# Patient Record
Sex: FEMALE | Race: BLACK OR AFRICAN AMERICAN | Employment: UNEMPLOYED | URBAN - METROPOLITAN AREA
[De-identification: names, ages, dates, MRNs, and addresses within clinical notes are randomized per-mention and may not be internally consistent; named-entity substitution may affect disease eponyms.]

---

## 2024-02-29 PROCEDURE — 93005 ELECTROCARDIOGRAM TRACING: CPT | Performed by: EMERGENCY MEDICINE

## 2024-02-29 PROCEDURE — 96374 THER/PROPH/DIAG INJ IV PUSH: CPT | Performed by: EMERGENCY MEDICINE

## 2024-02-29 PROCEDURE — 99285 EMERGENCY DEPT VISIT HI MDM: CPT | Mod: 25 | Performed by: EMERGENCY MEDICINE

## 2024-02-29 PROCEDURE — 99291 CRITICAL CARE FIRST HOUR: CPT | Mod: 25 | Performed by: EMERGENCY MEDICINE

## 2024-02-29 PROCEDURE — 93010 ELECTROCARDIOGRAM REPORT: CPT | Performed by: EMERGENCY MEDICINE

## 2024-02-29 ASSESSMENT — COLUMBIA-SUICIDE SEVERITY RATING SCALE - C-SSRS
1. IN THE PAST MONTH, HAVE YOU WISHED YOU WERE DEAD OR WISHED YOU COULD GO TO SLEEP AND NOT WAKE UP?: NO
6. HAVE YOU EVER DONE ANYTHING, STARTED TO DO ANYTHING, OR PREPARED TO DO ANYTHING TO END YOUR LIFE?: NO
2. HAVE YOU ACTUALLY HAD ANY THOUGHTS OF KILLING YOURSELF IN THE PAST MONTH?: NO

## 2024-03-01 ENCOUNTER — HOSPITAL ENCOUNTER (EMERGENCY)
Facility: CLINIC | Age: 74
Discharge: HOME | End: 2024-03-01
Attending: EMERGENCY MEDICINE | Admitting: EMERGENCY MEDICINE

## 2024-03-01 ENCOUNTER — APPOINTMENT (OUTPATIENT)
Dept: CT IMAGING | Facility: CLINIC | Age: 74
End: 2024-03-01
Attending: EMERGENCY MEDICINE

## 2024-03-01 ENCOUNTER — APPOINTMENT (OUTPATIENT)
Dept: GENERAL RADIOLOGY | Facility: CLINIC | Age: 74
End: 2024-03-01
Attending: EMERGENCY MEDICINE

## 2024-03-01 VITALS
HEART RATE: 68 BPM | TEMPERATURE: 97.3 F | RESPIRATION RATE: 15 BRPM | DIASTOLIC BLOOD PRESSURE: 76 MMHG | OXYGEN SATURATION: 95 % | WEIGHT: 143.7 LBS | SYSTOLIC BLOOD PRESSURE: 152 MMHG | BODY MASS INDEX: 26.44 KG/M2 | HEIGHT: 62 IN

## 2024-03-01 DIAGNOSIS — R05.3 CHRONIC COUGH: ICD-10-CM

## 2024-03-01 DIAGNOSIS — I16.0 HYPERTENSIVE URGENCY: ICD-10-CM

## 2024-03-01 LAB
ALBUMIN SERPL BCG-MCNC: 4 G/DL (ref 3.5–5.2)
ALP SERPL-CCNC: 141 U/L (ref 40–150)
ALT SERPL W P-5'-P-CCNC: 8 U/L (ref 0–50)
ANION GAP SERPL CALCULATED.3IONS-SCNC: 13 MMOL/L (ref 7–15)
AST SERPL W P-5'-P-CCNC: 18 U/L (ref 0–45)
ATRIAL RATE - MUSE: 77 BPM
BASOPHILS # BLD AUTO: 0.1 10E3/UL (ref 0–0.2)
BASOPHILS NFR BLD AUTO: 1 %
BILIRUB SERPL-MCNC: 0.3 MG/DL
BUN SERPL-MCNC: 18.2 MG/DL (ref 8–23)
CALCIUM SERPL-MCNC: 9.1 MG/DL (ref 8.8–10.2)
CHLORIDE SERPL-SCNC: 102 MMOL/L (ref 98–107)
CREAT SERPL-MCNC: 0.74 MG/DL (ref 0.51–0.95)
DEPRECATED HCO3 PLAS-SCNC: 23 MMOL/L (ref 22–29)
DIASTOLIC BLOOD PRESSURE - MUSE: NORMAL MMHG
EGFRCR SERPLBLD CKD-EPI 2021: 85 ML/MIN/1.73M2
EOSINOPHIL # BLD AUTO: 0.2 10E3/UL (ref 0–0.7)
EOSINOPHIL NFR BLD AUTO: 3 %
ERYTHROCYTE [DISTWIDTH] IN BLOOD BY AUTOMATED COUNT: 13.5 % (ref 10–15)
FLUAV RNA SPEC QL NAA+PROBE: NEGATIVE
FLUBV RNA RESP QL NAA+PROBE: NEGATIVE
GLUCOSE SERPL-MCNC: 102 MG/DL (ref 70–99)
HCT VFR BLD AUTO: 35.4 % (ref 35–47)
HGB BLD-MCNC: 11.9 G/DL (ref 11.7–15.7)
IMM GRANULOCYTES # BLD: 0.1 10E3/UL
IMM GRANULOCYTES NFR BLD: 1 %
INTERPRETATION ECG - MUSE: NORMAL
LYMPHOCYTES # BLD AUTO: 2.1 10E3/UL (ref 0.8–5.3)
LYMPHOCYTES NFR BLD AUTO: 30 %
MCH RBC QN AUTO: 29.1 PG (ref 26.5–33)
MCHC RBC AUTO-ENTMCNC: 33.6 G/DL (ref 31.5–36.5)
MCV RBC AUTO: 87 FL (ref 78–100)
MONOCYTES # BLD AUTO: 0.7 10E3/UL (ref 0–1.3)
MONOCYTES NFR BLD AUTO: 10 %
NEUTROPHILS # BLD AUTO: 3.8 10E3/UL (ref 1.6–8.3)
NEUTROPHILS NFR BLD AUTO: 55 %
NRBC # BLD AUTO: 0 10E3/UL
NRBC BLD AUTO-RTO: 0 /100
P AXIS - MUSE: 48 DEGREES
PLATELET # BLD AUTO: 276 10E3/UL (ref 150–450)
POTASSIUM SERPL-SCNC: 3.9 MMOL/L (ref 3.4–5.3)
PR INTERVAL - MUSE: 180 MS
PROCALCITONIN SERPL IA-MCNC: 0.02 NG/ML
PROT SERPL-MCNC: 6.8 G/DL (ref 6.4–8.3)
QRS DURATION - MUSE: 74 MS
QT - MUSE: 408 MS
QTC - MUSE: 461 MS
R AXIS - MUSE: -17 DEGREES
RBC # BLD AUTO: 4.09 10E6/UL (ref 3.8–5.2)
RSV RNA SPEC NAA+PROBE: NEGATIVE
SARS-COV-2 RNA RESP QL NAA+PROBE: NEGATIVE
SODIUM SERPL-SCNC: 138 MMOL/L (ref 135–145)
SYSTOLIC BLOOD PRESSURE - MUSE: NORMAL MMHG
T AXIS - MUSE: -14 DEGREES
TROPONIN T SERPL HS-MCNC: 7 NG/L
VENTRICULAR RATE- MUSE: 77 BPM
WBC # BLD AUTO: 6.9 10E3/UL (ref 4–11)

## 2024-03-01 PROCEDURE — 80053 COMPREHEN METABOLIC PANEL: CPT | Performed by: EMERGENCY MEDICINE

## 2024-03-01 PROCEDURE — 71046 X-RAY EXAM CHEST 2 VIEWS: CPT

## 2024-03-01 PROCEDURE — 36415 COLL VENOUS BLD VENIPUNCTURE: CPT | Performed by: EMERGENCY MEDICINE

## 2024-03-01 PROCEDURE — 87637 SARSCOV2&INF A&B&RSV AMP PRB: CPT | Performed by: EMERGENCY MEDICINE

## 2024-03-01 PROCEDURE — 250N000013 HC RX MED GY IP 250 OP 250 PS 637: Performed by: EMERGENCY MEDICINE

## 2024-03-01 PROCEDURE — 85025 COMPLETE CBC W/AUTO DIFF WBC: CPT | Performed by: EMERGENCY MEDICINE

## 2024-03-01 PROCEDURE — 84145 PROCALCITONIN (PCT): CPT | Performed by: EMERGENCY MEDICINE

## 2024-03-01 PROCEDURE — 84484 ASSAY OF TROPONIN QUANT: CPT | Performed by: EMERGENCY MEDICINE

## 2024-03-01 PROCEDURE — 250N000011 HC RX IP 250 OP 636: Mod: JZ | Performed by: EMERGENCY MEDICINE

## 2024-03-01 PROCEDURE — 70450 CT HEAD/BRAIN W/O DYE: CPT

## 2024-03-01 RX ORDER — ACETAMINOPHEN 325 MG/1
650 TABLET ORAL ONCE
Status: COMPLETED | OUTPATIENT
Start: 2024-03-01 | End: 2024-03-01

## 2024-03-01 RX ORDER — LABETALOL HYDROCHLORIDE 5 MG/ML
10 INJECTION, SOLUTION INTRAVENOUS ONCE
Status: COMPLETED | OUTPATIENT
Start: 2024-03-01 | End: 2024-03-01

## 2024-03-01 RX ORDER — AMLODIPINE BESYLATE 2.5 MG/1
2.5 TABLET ORAL DAILY
Qty: 30 TABLET | Refills: 0 | Status: SHIPPED | OUTPATIENT
Start: 2024-03-01

## 2024-03-01 RX ADMIN — ACETAMINOPHEN 650 MG: 325 TABLET, FILM COATED ORAL at 01:01

## 2024-03-01 RX ADMIN — LABETALOL HYDROCHLORIDE 10 MG: 5 INJECTION, SOLUTION INTRAVENOUS at 01:02

## 2024-03-01 ASSESSMENT — ACTIVITIES OF DAILY LIVING (ADL)
ADLS_ACUITY_SCORE: 35

## 2024-03-01 NOTE — DISCHARGE INSTRUCTIONS
Instructions from your doctor today:  Emergency Department (ED) testing is focused on the potential causes of your symptoms that are the most dangerous possibilities, and cannot cover every possibility. Based on the evaluation, it was deemed sufficiently safe to discharge and continue management through the clinics. Thus, follow-up is very important to assess for improvement/worsening, potential further testing, and potential treatment adjustments. If you were given opioid pain medications or other medications that can make you drowsy while in the ED, you should not drive for at least several hours and not until you feel completely back to normal.     Please make an appointment to follow up with:  - Your Primary Care Provider in 2-7 days  - If you do not have a primary care provider, you can be seen in follow-up and establish care by calling any of the clinics below:     - [facility] (phone: [phone])     - [facility] (phone: [phone])   - Have your clinic provider review the results from today's visit with you again, including any potential follow-up or additional testing that may be needed based on the results. Occasionally, incidental findings are found on later review by radiologists that may need follow-up.     Return to the Emergency Department immediately if you have worsening symptoms, or any other urgent health concerns.

## 2024-03-01 NOTE — ED PROVIDER NOTES
"  History     Chief Complaint   Patient presents with    Hypertension     Pt reports high BP at home, is not on any medication.  Pt recently [place] to the [place], has been here for 18 days.      Headache    Cough     Dry, infrequent cough for 18 days     HPI  Arabella Man is a 74 year old female with PMH notable for recent [place] the [place] ago  who presents to the ED with cough and headache.  Family used to interpret at patient request.  Patient notes cough for several weeks, did precede coming to the [place] 18 days ago.  Patient also with associated sore throat.  Mild discomfort in the right ear as well.  Patient notes pain in the left scapular area for the past 1 day as well.  No trauma.  Breathing feels normal.  Patient also notes pain on the right foot with onset around the same time.  No foot trauma.    Patient also notes issues with headache.  Symptoms started 1 day ago.  Patient reports similar symptoms in the past when having elevated blood pressure.  She did take a blood pressure medication when [place], has been off it for about 1 month.  Patient with right-sided headache.  No recent fall.  No numbness nor weakness.    Physical Exam   BP: (!) 208/92  Pulse: 109  Temp: 97.3  F (36.3  C)  Resp: 16  Height: 157.5 cm (5' 2\")  Weight: 65.2 kg (143 lb 11.2 oz)  SpO2: 98 %    Physical Exam  General: appears somewhat tired, occasionally holding right side of head. Appears stated age.   HENT: MMM, no oropharyngeal lesions.  Midline uvula, no significant tonsillar hypertrophy or exudate.  Bilateral ear canals and TMs normal.  Eyes: PERRL, normal sclerae   Cardio: regular rate. Regular rhythm. Extremities well perfused  Resp: Normal work of breathing, Normal respiratory rate. Clear to auscultation bilaterally.  Chest: Scapular area pain is reproduced with palpation over the inferior margin of the scapula, no visible signs of trauma  Abdomen: no tenderness, non-distended, no rebound, no guarding  Neuro: " alert without signs of confusion. Facial movements symmetric.  Extraocular movements all intact.  Vision grossly normal. Strength left: 5/5 , 5/5 elbow flexion, 5/5 elbow extension, 5/5 shoulder abduction, 5/5 hip flexion, 5/5 knee flexion, 5/5 knee extension. Strength right: 5/5 , 5/5 elbow flexion, 5/5 elbow extension, 5/5 shoulder abduction, 5/5 hip flexion, 5/5 knee flexion, 5/5 knee extension. Sensation intact to soft touch in all extremities. Normal tone, no clonus.   Psych: normal affect, normal behavior  Feet: No swelling, not overly warm, no skin break.  Right foot with mild diffuse tenderness does not localize well.    ED Course      Procedures            EKG Interpretation:      Interpreted by Sid Zendejas MD  Time reviewed: 0007  Symptoms at time of EKG: chest pain   Rhythm: normal sinus   Rate: Normal  Axis: Normal  Ectopy: none  Conduction: normal  ST Segments/ T Waves: No acute ischemic changes  Q Waves: none  Comparison to prior: No old EKG available    Clinical Impression: Voltage criteria consistent with LVH, otherwise normal sinus rhythm without evidence of acute ischemia               Labs Ordered and Resulted from Time of ED Arrival to Time of ED Departure   COMPREHENSIVE METABOLIC PANEL - Abnormal       Result Value    Sodium 138      Potassium 3.9      Carbon Dioxide (CO2) 23      Anion Gap 13      Urea Nitrogen 18.2      Creatinine 0.74      GFR Estimate 85      Calcium 9.1      Chloride 102      Glucose 102 (*)     Alkaline Phosphatase 141      AST 18      ALT 8      Protein Total 6.8      Albumin 4.0      Bilirubin Total 0.3     TROPONIN T, HIGH SENSITIVITY - Normal    Troponin T, High Sensitivity 7     PROCALCITONIN - Normal    Procalcitonin 0.02     INFLUENZA A/B, RSV, & SARS-COV2 PCR - Normal    Influenza A PCR Negative      Influenza B PCR Negative      RSV PCR Negative      SARS CoV2 PCR Negative     CBC WITH PLATELETS AND DIFFERENTIAL    WBC Count 6.9      RBC Count 4.09       Hemoglobin 11.9      Hematocrit 35.4      MCV 87      MCH 29.1      MCHC 33.6      RDW 13.5      Platelet Count 276      % Neutrophils 55      % Lymphocytes 30      % Monocytes 10      % Eosinophils 3      % Basophils 1      % Immature Granulocytes 1      NRBCs per 100 WBC 0      Absolute Neutrophils 3.8      Absolute Lymphocytes 2.1      Absolute Monocytes 0.7      Absolute Eosinophils 0.2      Absolute Basophils 0.1      Absolute Immature Granulocytes 0.1      Absolute NRBCs 0.0       CT Head w/o Contrast   Final Result   IMPRESSION:   1.  No CT finding of a mass, hemorrhage or focal area suggestive of acute infarct.   2.  Diffuse age related changes.      XR Chest 2 Views   Final Result   IMPRESSION: Somewhat low lung volumes. Mild bibasilar atelectasis. No pleural fluid or pneumothorax. Normal heart size and pulmonary vascularity.             Critical Care Addendum  My initial assessment, based on my review of vital signs, focused history, and physical exam, established a high suspicion that Arabella Man has severe hypertension, which requires immediate intervention, and therefore she is critically ill.     After the initial assessment, the care team initiated multiple lab tests, initiated medication therapy with IV labetalol, and performed CT head and CXR  to provide stabilization care. Due to the critical nature of this patient, I reassessed vital signs, physical exam, and neurologic status multiple times prior to her disposition.     Time also spent performing documentation, discussion with family to obtain medical information for decision making, reviewing test results, and coordination of care.     Critical care time (excluding teaching time and procedures): 38 minutes.       Assessments & Plan   Patient presenting with headache, cough, left scapular area pain. Vitals in the ED notable for initial /92. Nursing notes reviewed. Initial differential diagnosis includes but not limited to hypertensive  emergency, aneurysmal SAH, ACS, hypertension, pneumonia, viral URI.     EKG showed evidence of LVH, was without evidence of acute cardiac ischemia.  High-sensitivity troponin was within normal limits.  ACS thus very unlikely.  IV labetalol given for very elevated blood pressure over 200 systolic with symptoms suggestive of hypertensive urgency/emergency.  BP improved to 140s over 70s.  Patient's headache symptoms also greatly improved as the blood pressure reduced.  Acetaminophen also given for headache.  CT head was without evidence of intracranial hemorrhage nor other acute pathology, did show age-related changes.    For patient's cough symptoms, chest x-ray was without evidence of pneumonia, pneumothorax, pleural effusion, nor other visualized significant pathology.  Mild atelectasis was present.  No shortness of breath, no pleuritic chest pain, no signs of DVT to suggest PE.  No procalcitonin elevation, no leukocytosis, no fever to suggest bacterial infection.  COVID and influenza swab negative.  Electrolytes unremarkable.  LFTs unremarkable. The pain in the left scapular area was reproducible with palpation of the musculature suggestive of likely muscular etiology related to the cough that has been present for the past month plus.      After counseling on the diagnosis, work-up, and treatment plan, the patient was discharged to home with family. The patient was advised to follow-up with primary care in a few days. The patient was advised to return to the ED if worsening symptoms, or any urgent health concerns.     Final diagnoses:   Hypertensive urgency   Chronic cough     New Prescriptions    AMLODIPINE (NORVASC) 2.5 MG TABLET    Take 1 tablet (2.5 mg) by mouth daily     --  Sid Znedejas MD   Emergency Medicine   [facility] EMERGENCY DEPARTMENT  2/29/2024       Sid Zendejas MD  03/01/24 0334

## 2024-10-03 ENCOUNTER — APPOINTMENT (OUTPATIENT)
Dept: CT IMAGING | Facility: CLINIC | Age: 74
End: 2024-10-03
Attending: EMERGENCY MEDICINE
Payer: COMMERCIAL

## 2024-10-03 ENCOUNTER — HOSPITAL ENCOUNTER (EMERGENCY)
Facility: CLINIC | Age: 74
Discharge: HOME | End: 2024-10-04
Attending: EMERGENCY MEDICINE | Admitting: EMERGENCY MEDICINE
Payer: COMMERCIAL

## 2024-10-03 DIAGNOSIS — K59.00 CONSTIPATION, UNSPECIFIED CONSTIPATION TYPE: ICD-10-CM

## 2024-10-03 DIAGNOSIS — R10.84 GENERALIZED ABDOMINAL PAIN: ICD-10-CM

## 2024-10-03 LAB
ALBUMIN SERPL BCG-MCNC: 4 G/DL (ref 3.5–5.2)
ALP SERPL-CCNC: 160 U/L (ref 40–150)
ALT SERPL W P-5'-P-CCNC: 8 U/L (ref 0–50)
ANION GAP SERPL CALCULATED.3IONS-SCNC: 11 MMOL/L (ref 7–15)
AST SERPL W P-5'-P-CCNC: 14 U/L (ref 0–45)
BASOPHILS # BLD AUTO: 0.1 10E3/UL (ref 0–0.2)
BASOPHILS NFR BLD AUTO: 1 %
BILIRUB SERPL-MCNC: 0.3 MG/DL
BUN SERPL-MCNC: 22.9 MG/DL (ref 8–23)
CALCIUM SERPL-MCNC: 9.5 MG/DL (ref 8.8–10.4)
CHLORIDE SERPL-SCNC: 103 MMOL/L (ref 98–107)
CREAT SERPL-MCNC: 0.91 MG/DL (ref 0.51–0.95)
EGFRCR SERPLBLD CKD-EPI 2021: 66 ML/MIN/1.73M2
EOSINOPHIL # BLD AUTO: 0.2 10E3/UL (ref 0–0.7)
EOSINOPHIL NFR BLD AUTO: 3 %
ERYTHROCYTE [DISTWIDTH] IN BLOOD BY AUTOMATED COUNT: 13.6 % (ref 10–15)
GLUCOSE BLDC GLUCOMTR-MCNC: 183 MG/DL (ref 70–99)
GLUCOSE SERPL-MCNC: 203 MG/DL (ref 70–99)
HCO3 SERPL-SCNC: 24 MMOL/L (ref 22–29)
HCT VFR BLD AUTO: 34 % (ref 35–47)
HGB BLD-MCNC: 11.2 G/DL (ref 11.7–15.7)
IMM GRANULOCYTES # BLD: 0.1 10E3/UL
IMM GRANULOCYTES NFR BLD: 1 %
LIPASE SERPL-CCNC: 11 U/L (ref 13–60)
LYMPHOCYTES # BLD AUTO: 2 10E3/UL (ref 0.8–5.3)
LYMPHOCYTES NFR BLD AUTO: 27 %
MCH RBC QN AUTO: 28.3 PG (ref 26.5–33)
MCHC RBC AUTO-ENTMCNC: 32.9 G/DL (ref 31.5–36.5)
MCV RBC AUTO: 86 FL (ref 78–100)
MONOCYTES # BLD AUTO: 0.7 10E3/UL (ref 0–1.3)
MONOCYTES NFR BLD AUTO: 10 %
NEUTROPHILS # BLD AUTO: 4.2 10E3/UL (ref 1.6–8.3)
NEUTROPHILS NFR BLD AUTO: 59 %
NRBC # BLD AUTO: 0 10E3/UL
NRBC BLD AUTO-RTO: 0 /100
PLATELET # BLD AUTO: 276 10E3/UL (ref 150–450)
POTASSIUM SERPL-SCNC: 3.5 MMOL/L (ref 3.4–5.3)
PROT SERPL-MCNC: 6.9 G/DL (ref 6.4–8.3)
RBC # BLD AUTO: 3.96 10E6/UL (ref 3.8–5.2)
SODIUM SERPL-SCNC: 138 MMOL/L (ref 135–145)
WBC # BLD AUTO: 7.2 10E3/UL (ref 4–11)

## 2024-10-03 PROCEDURE — 82040 ASSAY OF SERUM ALBUMIN: CPT | Performed by: EMERGENCY MEDICINE

## 2024-10-03 PROCEDURE — 85014 HEMATOCRIT: CPT | Performed by: EMERGENCY MEDICINE

## 2024-10-03 PROCEDURE — 250N000009 HC RX 250: Performed by: EMERGENCY MEDICINE

## 2024-10-03 PROCEDURE — 99285 EMERGENCY DEPT VISIT HI MDM: CPT | Mod: 25 | Performed by: EMERGENCY MEDICINE

## 2024-10-03 PROCEDURE — 99284 EMERGENCY DEPT VISIT MOD MDM: CPT | Performed by: EMERGENCY MEDICINE

## 2024-10-03 PROCEDURE — 74177 CT ABD & PELVIS W/CONTRAST: CPT

## 2024-10-03 PROCEDURE — 85004 AUTOMATED DIFF WBC COUNT: CPT | Performed by: EMERGENCY MEDICINE

## 2024-10-03 PROCEDURE — 36415 COLL VENOUS BLD VENIPUNCTURE: CPT | Performed by: EMERGENCY MEDICINE

## 2024-10-03 PROCEDURE — 83690 ASSAY OF LIPASE: CPT | Performed by: EMERGENCY MEDICINE

## 2024-10-03 PROCEDURE — 250N000011 HC RX IP 250 OP 636: Performed by: EMERGENCY MEDICINE

## 2024-10-03 PROCEDURE — 82962 GLUCOSE BLOOD TEST: CPT

## 2024-10-03 RX ORDER — IOPAMIDOL 755 MG/ML
100 INJECTION, SOLUTION INTRAVASCULAR ONCE
Status: COMPLETED | OUTPATIENT
Start: 2024-10-03 | End: 2024-10-03

## 2024-10-03 RX ORDER — AMLODIPINE BESYLATE 5 MG/1
1 TABLET ORAL
COMMUNITY
Start: 2024-09-25

## 2024-10-03 RX ADMIN — SODIUM CHLORIDE 36 ML: 9 INJECTION, SOLUTION INTRAVENOUS at 22:46

## 2024-10-03 RX ADMIN — IOPAMIDOL 72 ML: 755 INJECTION, SOLUTION INTRAVENOUS at 22:45

## 2024-10-03 ASSESSMENT — COLUMBIA-SUICIDE SEVERITY RATING SCALE - C-SSRS
2. HAVE YOU ACTUALLY HAD ANY THOUGHTS OF KILLING YOURSELF IN THE PAST MONTH?: NO
6. HAVE YOU EVER DONE ANYTHING, STARTED TO DO ANYTHING, OR PREPARED TO DO ANYTHING TO END YOUR LIFE?: NO
1. IN THE PAST MONTH, HAVE YOU WISHED YOU WERE DEAD OR WISHED YOU COULD GO TO SLEEP AND NOT WAKE UP?: NO

## 2024-10-03 ASSESSMENT — ACTIVITIES OF DAILY LIVING (ADL)
ADLS_ACUITY_SCORE: 35

## 2024-10-04 VITALS
RESPIRATION RATE: 16 BRPM | OXYGEN SATURATION: 100 % | HEIGHT: 62 IN | DIASTOLIC BLOOD PRESSURE: 78 MMHG | BODY MASS INDEX: 27.23 KG/M2 | HEART RATE: 76 BPM | WEIGHT: 148 LBS | SYSTOLIC BLOOD PRESSURE: 164 MMHG | TEMPERATURE: 97.8 F

## 2024-10-04 RX ORDER — POLYETHYLENE GLYCOL 3350 17 G/17G
17 POWDER, FOR SOLUTION ORAL DAILY
Qty: 510 G | Refills: 0 | Status: SHIPPED | OUTPATIENT
Start: 2024-10-04

## 2024-10-04 NOTE — ED NOTES
Daughter, Jocelin #[phone], returning to work but available by phone at anytime for concerns and discharge.

## 2024-10-04 NOTE — ED PROVIDER NOTES
[facility] EMERGENCY DEPARTMENT ([facility])    10/03/24      ED PROVIDER NOTE    History     Chief Complaint   Patient presents with    Abdominal Pain     C/o abdominal discomfort starting around 1400. Reports constipation, last BM this AM.       used: Prydeinig.     Arabella Man is a 74 year old female with history of HTN and recent arrival to the  this year who presents to the ED with complaints of abdominal pain. Patient reports severe abdominal pain that began this afternoon around 2:00 pm. It has been intermittent. She describes it as a stabbing pain. Worst in the LLQ. She endorses nausea, no vomiting. No diarrhea but she reports constipation. She also reports bilateral LE pain from the knees down. She describes this as a burning sensation.     Past Medical History  Past Medical History:   Diagnosis Date    Hypertension      No past surgical history on file.  amLODIPine (NORVASC) 5 MG tablet  amLODIPine (NORVASC) 2.5 MG tablet      No Known Allergies  Family History  No family history on file.  Social History   Social History     Tobacco Use    Smoking status: Never    Smokeless tobacco: Never   Substance Use Topics    Alcohol use: Never    Drug use: Never      Past medical history, past surgical history, medications, allergies, family history, and social history were reviewed with the patient. No additional pertinent items.     A complete review of systems was performed with pertinent positives and negatives noted in the HPI, and all other systems negative.    Physical Exam   BP: 131/62  Pulse: 82  Temp: 97.8  F (36.6  C)  Resp: 16  SpO2: 97 %  Physical Exam  Constitutional:       General: She is not in acute distress.     Appearance: She is not diaphoretic.   HENT:      Head: Atraumatic.   Eyes:      Pupils: Pupils are equal, round, and reactive to light.   Cardiovascular:      Rate and Rhythm: Regular rhythm.      Heart sounds: Normal heart sounds.   Pulmonary:      Effort: No  respiratory distress.      Breath sounds: Normal breath sounds.   Chest:      Chest wall: No tenderness.   Abdominal:      General: Bowel sounds are normal.      Palpations: Abdomen is soft.      Tenderness: There is no abdominal tenderness.   Musculoskeletal:         General: No tenderness. Normal range of motion.      Cervical back: No tenderness.      Thoracic back: No tenderness.      Lumbar back: No tenderness.   Skin:     General: Skin is warm and dry.      Findings: No abrasion or laceration.   Neurological:      Mental Status: She is alert and oriented to person, place, and time.       ED Course, Procedures, & Data      Procedures          No results found for any visits on 10/03/24.  Medications - No data to display  Labs Ordered and Resulted from Time of ED Arrival to Time of ED Departure - No data to display  No orders to display          Critical care was not performed.     Medical Decision Making  The patient's presentation was of high complexity (an acute health issue posing potential threat to life or bodily function).    The patient's evaluation involved:  ordering and/or review of 3+ test(s) in this encounter (see separate area of note for details)    The patient's management necessitated only low risk treatment.    Assessment & Plan      74 year old female with history of HTN and recent [place] the [place] this [place] who presents to the ED with complaints of abdominal pain. Patient reports severe abdominal pain that began this afternoon around 2:00 pm.  Vital signs stable and afebrile including normal pulse ox at 100% on room air.  IV established, labs sent which revealed normal CBC and electrolytes, normal lipase.  Patient sent to CT for imaging abdomen pelvis which revealed no acute process.  Plan for discharge and follow-up with primary care provider for further evaluation and care.    I have reviewed the nursing notes. I have reviewed the findings, diagnosis, plan and need for follow up with the  patient.    New Prescriptions    No medications on file       Final diagnoses:   Generalized abdominal pain   Constipation, unspecified constipation type   I, Tana Gonzales, am serving as a trained medical scribe to document services personally performed by Real Leach MD based on the provider's statements to me on October 4, 2024.  This document has been checked and approved by the attending provider.    I, Real Leach MD, was physically present and have reviewed and verified the accuracy of this note documented by Tana Gonzales medical scribe.      Real Leach MD  [facility] EMERGENCY DEPARTMENT  10/3/2024     Real Leach MD  10/09/24 7822

## 2024-10-04 NOTE — ED TRIAGE NOTES
C/o abdominal discomfort starting around 1400. Reports constipation, last BM this AM.      Triage Assessment (Adult)       Row Name 10/03/24 2033          Triage Assessment    Airway WDL WDL        Respiratory WDL    Respiratory WDL WDL        Skin Circulation/Temperature WDL    Skin Circulation/Temperature WDL WDL        Cardiac WDL    Cardiac WDL WDL        Peripheral/Neurovascular WDL    Peripheral Neurovascular WDL WDL        Cognitive/Neuro/Behavioral WDL    Cognitive/Neuro/Behavioral WDL WDL

## 2024-10-04 NOTE — ED NOTES
Pt discharged to home by self. Daughter at the bedside. Discharge instructions and education given, pt and daughter stated understanding. All questions and concerns addressed.